# Patient Record
Sex: FEMALE | Race: WHITE
[De-identification: names, ages, dates, MRNs, and addresses within clinical notes are randomized per-mention and may not be internally consistent; named-entity substitution may affect disease eponyms.]

---

## 2019-01-07 ENCOUNTER — HOSPITAL ENCOUNTER (OUTPATIENT)
Dept: HOSPITAL 62 - RAD | Age: 29
End: 2019-01-07
Attending: NURSE PRACTITIONER
Payer: OTHER GOVERNMENT

## 2019-01-07 DIAGNOSIS — R59.1: Primary | ICD-10-CM

## 2019-01-07 PROCEDURE — 76536 US EXAM OF HEAD AND NECK: CPT

## 2019-01-07 NOTE — RADIOLOGY REPORT (SQ)
EXAM DESCRIPTION:  U/S THYROID/SFT TISS HD   NECK



COMPLETED DATE/TIME:  1/7/2019 1:59 pm



REASON FOR STUDY:  R59.1GENERALIZED ENLARGED LYMPH NODES R59.1  GENERALIZED ENLARGED LYMPH NODES



COMPARISON:  None.



TECHNIQUE:  Dynamic and static gray-scale images acquired of the thyroid gland. Selected additional c
olor/power Doppler images recorded.  All images stored to PACS.



LIMITATIONS:  None.



FINDINGS:  RIGHT LOBE: Normal size, 4.2 x 1.3 x 1.3 cm.  Homogeneous echotexture.  No cystic or solid
 masses.

LEFT LOBE: Normal size, 4.3 x 1.5 x 1.2 cm.  Homogeneous echotexture.  No cystic or solid masses.

ISTHMUS: Normal size, 2.3 mm.  Homogeneous echotexture.  No cystic or solid masses.

OTHER: Small lymph nodes are seen in the neck, axillae, and groin.  The largest is in the right groin
 that measures 8 mm in short axis diameter.



IMPRESSION:  Normal thyroid gland.  Small nonspecific lymph nodes.



TECHNICAL DOCUMENTATION:  JOB ID:  [id number]

 2011 [employer]- All Rights Reserved



Reading location - IP/workstation name: [email or web address]

## 2019-04-23 ENCOUNTER — HOSPITAL ENCOUNTER (OUTPATIENT)
Dept: HOSPITAL 62 - WI | Age: 29
End: 2019-04-23
Attending: NURSE PRACTITIONER
Payer: OTHER GOVERNMENT

## 2019-04-23 DIAGNOSIS — N64.52: Primary | ICD-10-CM

## 2019-04-23 PROCEDURE — 77066 DX MAMMO INCL CAD BI: CPT

## 2019-04-23 PROCEDURE — 76642 ULTRASOUND BREAST LIMITED: CPT

## 2019-04-23 NOTE — WOMENS IMAGING REPORT
EXAM DESCRIPTION:  BILAT DIAGNOSTIC MAMMO W/CAD; U/S BREAST UNILAT LIMITED



COMPLETED DATE/TIME:  4/23/2019 8:26 am; 4/23/2019 9:02 am



REASON FOR STUDY:  N64.52 NIPPLE DISCHARGE; N64.52 NIPPLE DISCHARGE RIGHT BREAST; N64.52 NIPPLE DISCH
ARGE LEFT BREAST N64.52  NIPPLE DISCHARGE



COMPARISON:  None.



TECHNIQUE:  Standard craniocaudal and mediolateral oblique views of each breast recorded using digita
l acquisition.

Additional true lateral images of both breasts acquired.



LIMITATIONS:  None.



FINDINGS:  RIGHT BREAST

MASSES: No suspicious masses.

CALCIFICATIONS: No new or suspicious calcifications.

ARCHITECTURAL DISTORTION: None.

DEVELOPING DENSITY: None.

ASYMMETRY: None noted.

OTHER: No other significant findings.

LEFT BREAST

MASSES: No suspicious masses.

CALCIFICATIONS: No new or suspicious calcifications.

ARCHITECTURAL DISTORTION: None.

DEVELOPING DENSITY: None.

ASYMMETRY: None noted.

OTHER: No other significant finding.

Read with the assistance of CAD:

.[facility] - R2 Cenova Version 1.3

.[facility] - R2 Cenova Version 2.1

[facility] - R2 Cenova Version 2.4

.[facility] [place] R2 Cenova Version 2.4

.[facility] - R2  Version 9.2

BREAST ULTRASOUND:

TECHNIQUE: Static and dynamic grayscale images acquired of the right and left breast in the specific 
areas of clinical/mammographic concern. Selected color Doppler images recorded.

ELASTOGRAPHY PERFORMED: No.

LIMITATIONS: None.

FINDINGS:

MASS: No mass identified.  Normal glandular tissue.

ELASTOGRAPHY CHARACTERISTICS: Not applicable.

OTHER: No other significant finding.



IMPRESSION:  Unremarkable bilateral mammogram and bilateral breast ultrasound.  No abnormal findings.




BREAST DENSITY:  d. The breasts are extremely dense, which lowers the sensitivity of mammography.



BIRAD:  1 Negative.



RECOMMENDATION:  RECOMMENDED FOLLOW UP: Birads 1 or 2: No breast imaging finding to explain the patie
nt's presenting complaint. Further intervention should be based on the degree of clinical suspicion.

SPECIFIC INTERVENTION/IMAGING/CONSULTATION RECOMMENDED:No additional intervention/ imaging/consultati
on needed at this time.

COMMUNICATION:The imaging findings were not discussed with the patient. Her referring provider has be
en notified of the findings.



COMMENT:  The patient has been notified of the results by letter per MQSA requirements. Additional no
tification policies are in place for contacting patient with suspicious or incomplete findings.

Quality ID #225: The American College of Radiology recommends an annual screening mammogram for women
 aged 40 years or over. This facility utilizes a reminder system to ensure that all patients receive 
reminder letters, and/or direct phone calls for appointments. This includes reminders for routine scr
eening mammograms, diagnostic mammograms, or other Breast Imaging Interventions when appropriate.  Th
is patient will be placed in the appropriate reminder system.

The American College of Radiology (ACR) has developed recommendations for screening MRI of the breast
s in certain patient populations, to be used in conjunction with mammography.  Breast MRI surveillanc
e may be appropriate for women with more than 20% lifetime risk of developing breast cancer  as deter
mined by genetic testing, significant family history of the disease, or history of mantle radiation f
or Hodgkins Disease.  ACR Practice Guidelines 2008.



TECHNICAL DOCUMENTATION:  FINDING NUMBER: (1)

ASSESSMENT: (1)

JOB ID:  [id number]

 2011 [employer]- All Rights Reserved



Reading location - IP/workstation name: [email or web address]

## 2019-04-23 NOTE — WOMENS IMAGING REPORT
EXAM DESCRIPTION:  BILAT DIAGNOSTIC MAMMO W/CAD; U/S BREAST UNILAT LIMITED



COMPLETED DATE/TIME:  4/23/2019 8:26 am; 4/23/2019 9:02 am



REASON FOR STUDY:  N64.52 NIPPLE DISCHARGE; N64.52 NIPPLE DISCHARGE RIGHT BREAST; N64.52 NIPPLE DISCH
ARGE LEFT BREAST N64.52  NIPPLE DISCHARGE



COMPARISON:  None.



TECHNIQUE:  Standard craniocaudal and mediolateral oblique views of each breast recorded using digita
l acquisition.

Additional true lateral images of both breasts acquired.



LIMITATIONS:  None.



FINDINGS:  RIGHT BREAST

MASSES: No suspicious masses.

CALCIFICATIONS: No new or suspicious calcifications.

ARCHITECTURAL DISTORTION: None.

DEVELOPING DENSITY: None.

ASYMMETRY: None noted.

OTHER: No other significant findings.

LEFT BREAST

MASSES: No suspicious masses.

CALCIFICATIONS: No new or suspicious calcifications.

ARCHITECTURAL DISTORTION: None.

DEVELOPING DENSITY: None.

ASYMMETRY: None noted.

OTHER: No other significant finding.

Read with the assistance of CAD:

.[facility] - R2 Cenova Version 1.3

.[facility] - R2 Cenova Version 2.1

.[facility] - R2 Cenova Version 2.4

.[facility] - R2 Cenova Version 2.4

.[facility] - R2  Version 9.2

BREAST ULTRASOUND:

TECHNIQUE: Static and dynamic grayscale images acquired of the right and left breast in the specific 
areas of clinical/mammographic concern. Selected color Doppler images recorded.

ELASTOGRAPHY PERFORMED: No.

LIMITATIONS: None.

FINDINGS:

MASS: No mass identified.  Normal glandular tissue.

ELASTOGRAPHY CHARACTERISTICS: Not applicable.

OTHER: No other significant finding.



IMPRESSION:  Unremarkable bilateral mammogram and bilateral breast ultrasound.  No abnormal findings.




BREAST DENSITY:  d. The breasts are extremely dense, which lowers the sensitivity of mammography.



BIRAD:  1 Negative.



RECOMMENDATION:  RECOMMENDED FOLLOW UP: Birads 1 or 2: No breast imaging finding to explain the patie
nt's presenting complaint. Further intervention should be based on the degree of clinical suspicion.

SPECIFIC INTERVENTION/IMAGING/CONSULTATION RECOMMENDED:No additional intervention/ imaging/consultati
on needed at this time.

COMMUNICATION:The imaging findings were not discussed with the patient. Her referring provider has be
en notified of the findings.



COMMENT:  The patient has been notified of the results by letter per MQSA requirements. Additional no
tification policies are in place for contacting patient with suspicious or incomplete findings.

Quality ID #225: The American College of Radiology recommends an annual screening mammogram for women
 aged 40 years or over. This facility utilizes a reminder system to ensure that all patients receive 
reminder letters, and/or direct phone calls for appointments. This includes reminders for routine scr
eening mammograms, diagnostic mammograms, or other Breast Imaging Interventions when appropriate.  Th
is patient will be placed in the appropriate reminder system.

The American College of Radiology (ACR) has developed recommendations for screening MRI of the breast
s in certain patient populations, to be used in conjunction with mammography.  Breast MRI surveillanc
e may be appropriate for women with more than 20% lifetime risk of developing breast cancer  as deter
mined by genetic testing, significant family history of the disease, or history of mantle radiation f
or Hodgkins Disease.  ACR Practice Guidelines 2008.



TECHNICAL DOCUMENTATION:  FINDING NUMBER: (1)

ASSESSMENT: (1)

JOB ID:  [id number]

 2011 [employer]- All Rights Reserved



Reading location - IP/workstation name: [email or web address]

## 2019-04-23 NOTE — WOMENS IMAGING REPORT
EXAM DESCRIPTION:  BILAT DIAGNOSTIC MAMMO W/CAD; U/S BREAST UNILAT LIMITED



COMPLETED DATE/TIME:  4/23/2019 8:26 am; 4/23/2019 9:02 am



REASON FOR STUDY:  N64.52 NIPPLE DISCHARGE; N64.52 NIPPLE DISCHARGE RIGHT BREAST; N64.52 NIPPLE DISCH
ARGE LEFT BREAST N64.52  NIPPLE DISCHARGE



COMPARISON:  None.



TECHNIQUE:  Standard craniocaudal and mediolateral oblique views of each breast recorded using digita
l acquisition.

Additional true lateral images of both breasts acquired.



LIMITATIONS:  None.



FINDINGS:  RIGHT BREAST

MASSES: No suspicious masses.

CALCIFICATIONS: No new or suspicious calcifications.

ARCHITECTURAL DISTORTION: None.

DEVELOPING DENSITY: None.

ASYMMETRY: None noted.

OTHER: No other significant findings.

LEFT BREAST

MASSES: No suspicious masses.

CALCIFICATIONS: No new or suspicious calcifications.

ARCHITECTURAL DISTORTION: None.

DEVELOPING DENSITY: None.

ASYMMETRY: None noted.

OTHER: No other significant finding.

Read with the assistance of CAD:

.[facility] - R2 Cenova Version 1.3

.[facility] - R2 Cenova Version 2.1

.\[facility]\"" [facility] - R2 Cenova Version 2.4

.[facility] - R2 Cenova Version 2.4

.[facility] - R2  Version 9.2

BREAST ULTRASOUND:

TECHNIQUE: Static and dynamic grayscale images acquired of the right and left breast in the specific 
areas of clinical/mammographic concern. Selected color Doppler images recorded.

ELASTOGRAPHY PERFORMED: No.

LIMITATIONS: None.

FINDINGS:

MASS: No mass identified.  Normal glandular tissue.

ELASTOGRAPHY CHARACTERISTICS: Not applicable.

OTHER: No other significant finding.



IMPRESSION:  Unremarkable bilateral mammogram and bilateral breast ultrasound.  No abnormal findings.




BREAST DENSITY:  d. The breasts are extremely dense, which lowers the sensitivity of mammography.



BIRAD:  1 Negative.



RECOMMENDATION:  RECOMMENDED FOLLOW UP: Birads 1 or 2: No breast imaging finding to explain the patie
nt's presenting complaint. Further intervention should be based on the degree of clinical suspicion.

SPECIFIC INTERVENTION/IMAGING/CONSULTATION RECOMMENDED:No additional intervention/ imaging/consultati
on needed at this time.

COMMUNICATION:The imaging findings were not discussed with the patient. Her referring provider has be
en notified of the findings.



COMMENT:  The patient has been notified of the results by letter per MQSA requirements. Additional no
tification policies are in place for contacting patient with suspicious or incomplete findings.

Quality ID #225: The American College of Radiology recommends an annual screening mammogram for women
 aged 40 years or over. This facility utilizes a reminder system to ensure that all patients receive 
reminder letters, and/or direct phone calls for appointments. This includes reminders for routine scr
eening mammograms, diagnostic mammograms, or other Breast Imaging Interventions when appropriate.  Th
is patient will be placed in the appropriate reminder system.

The American College of Radiology (ACR) has developed recommendations for screening MRI of the breast
s in certain patient populations, to be used in conjunction with mammography.  Breast MRI surveillanc
e may be appropriate for women with more than 20% lifetime risk of developing breast cancer  as deter
mined by genetic testing, significant family history of the disease, or history of mantle radiation f
or Hodgkins Disease.  ACR Practice Guidelines 2008.



TECHNICAL DOCUMENTATION:  FINDING NUMBER: (1)

ASSESSMENT: (1)

JOB ID:  [id number]

 2011 [employer]- All Rights Reserved



Reading location - IP/workstation name: [email or web address]